# Patient Record
Sex: FEMALE | Race: BLACK OR AFRICAN AMERICAN | Employment: FULL TIME | URBAN - METROPOLITAN AREA
[De-identification: names, ages, dates, MRNs, and addresses within clinical notes are randomized per-mention and may not be internally consistent; named-entity substitution may affect disease eponyms.]

---

## 2019-06-24 ENCOUNTER — HOSPITAL ENCOUNTER (OUTPATIENT)
Dept: INFUSION THERAPY | Age: 34
Discharge: HOME | End: 2019-06-24
Payer: COMMERCIAL

## 2019-06-24 VITALS
RESPIRATION RATE: 18 BRPM | HEART RATE: 84 BPM | DIASTOLIC BLOOD PRESSURE: 71 MMHG | TEMPERATURE: 97.8 F | SYSTOLIC BLOOD PRESSURE: 122 MMHG

## 2019-06-24 LAB
ABO + RH BLD: NORMAL
BLOOD GROUP ANTIBODIES SERPL: NORMAL
SPECIMEN EXP DATE BLD: NORMAL

## 2019-06-24 PROCEDURE — 96372 THER/PROPH/DIAG INJ SC/IM: CPT

## 2019-06-24 PROCEDURE — 86900 BLOOD TYPING SEROLOGIC ABO: CPT

## 2019-06-24 PROCEDURE — 74011250636 HC RX REV CODE- 250/636: Performed by: OBSTETRICS & GYNECOLOGY

## 2019-06-24 PROCEDURE — 36415 COLL VENOUS BLD VENIPUNCTURE: CPT

## 2019-06-24 RX ORDER — PROGESTERONE 200 MG/1
200 CAPSULE ORAL DAILY
COMMUNITY
End: 2019-11-11

## 2019-06-24 RX ADMIN — HUMAN RHO(D) IMMUNE GLOBULIN 0.3 MG: 300 INJECTION, SOLUTION INTRAMUSCULAR at 13:56

## 2019-06-24 NOTE — PROGRESS NOTES
[facility] Progress Note    Date: 2019    Name: Paul Fitzgerald    MRN: [id number]         : 1985    Rhogam Injection      Ms. Chavarria to \[facility]\"", ambulatory at 1300. Pt was assessed and education was provided. Patient states she is 8 weeks pregnant. Pt given written info about Rhogam; explained reason for giving to Rh negative mothers. Pt verbalized understanding and signed consent form. Ms. Ronit Zavala vitals were reviewed and patient was observed for 5 minutes prior to treatment. Visit Vitals  /71 (BP 1 Location: Right arm, BP Patient Position: Sitting)   Pulse 84   Temp 97.8 °F (36.6 °C)   Resp 18   Breastfeeding? No       Lab results were obtained and reviewed. Recent Results (from the past 12 hour(s))   TYPE & SCREEN    Collection Time: 19 11:54 AM   Result Value Ref Range    Crossmatch Expiration 2019     ABO/Rh(D) PENDING     Antibody screen PENDING      Pt's blood type is O negative, which is within ordered parameters to give Rhogam today. Rhogam 300 mcg was administered IM in left deltoid. No irritation or bleeding noted at site. Bandaid applied. Ms. Gee Ritter tolerated well, and had no complaints. Pt observed for 30 minutes post-injection. She denied itching/hives/rash, SOB, difficulty swallowing or speaking, chest pain, any swelling or any other signs of allergic reaction. Pt given printed copy of discharge instructions; reviewed with her symptoms that require medical attention. Pt given Rhogam card with lot #, expiration date, etc.     Patient armband removed and shredded. Ms. Gee Ritter was discharged from [place] [address] in stable condition at [phone]. She is to follow-up with Dr. Hayley Alexander as instructed.     Lux Wnin RN  2019

## 2019-06-25 LAB
BLD PROD TYP BPU: NORMAL
BPU ID: NORMAL
GA (WEEKS): 8 WK
STATUS OF UNIT,%ST: NORMAL
UNIT DIVISION, %UDIV: 0

## 2019-11-11 ENCOUNTER — HOSPITAL ENCOUNTER (OUTPATIENT)
Dept: INFUSION THERAPY | Age: 34
Discharge: HOME | End: 2019-11-11
Payer: COMMERCIAL

## 2019-11-11 VITALS
DIASTOLIC BLOOD PRESSURE: 68 MMHG | RESPIRATION RATE: 18 BRPM | SYSTOLIC BLOOD PRESSURE: 112 MMHG | TEMPERATURE: 98.2 F | HEART RATE: 98 BPM

## 2019-11-11 LAB
25(OH)D3 SERPL-MCNC: 23.7 NG/ML (ref 30–100)
ABO + RH BLD: NORMAL
BLOOD GROUP ANTIBODIES SERPL: NORMAL
ERYTHROCYTE [DISTWIDTH] IN BLOOD BY AUTOMATED COUNT: 13.9 % (ref 11.6–14.5)
GLUCOSE 1H P 100 G GLC PO SERPL-MCNC: 110 MG/DL (ref 60–140)
HCT VFR BLD AUTO: 29.9 % (ref 35–45)
HGB BLD-MCNC: 10 G/DL (ref 12–16)
MCH RBC QN AUTO: 28.7 PG (ref 24–34)
MCHC RBC AUTO-ENTMCNC: 33.4 G/DL (ref 31–37)
MCV RBC AUTO: 85.9 FL (ref 74–97)
PLATELET # BLD AUTO: 126 K/UL (ref 135–420)
PMV BLD AUTO: 10.4 FL (ref 9.2–11.8)
RBC # BLD AUTO: 3.48 M/UL (ref 4.2–5.3)
RPR SER QL: NONREACTIVE
SPECIMEN EXP DATE BLD: NORMAL
WBC # BLD AUTO: 7.7 K/UL (ref 4.6–13.2)

## 2019-11-11 PROCEDURE — 74011250636 HC RX REV CODE- 250/636: Performed by: OBSTETRICS & GYNECOLOGY

## 2019-11-11 PROCEDURE — 86900 BLOOD TYPING SEROLOGIC ABO: CPT

## 2019-11-11 PROCEDURE — 82306 VITAMIN D 25 HYDROXY: CPT

## 2019-11-11 PROCEDURE — 82950 GLUCOSE TEST: CPT

## 2019-11-11 PROCEDURE — 36415 COLL VENOUS BLD VENIPUNCTURE: CPT

## 2019-11-11 PROCEDURE — 86592 SYPHILIS TEST NON-TREP QUAL: CPT

## 2019-11-11 PROCEDURE — 85027 COMPLETE CBC AUTOMATED: CPT

## 2019-11-11 PROCEDURE — 96372 THER/PROPH/DIAG INJ SC/IM: CPT

## 2019-11-11 RX ADMIN — HUMAN RHO(D) IMMUNE GLOBULIN 0.3 MG: 300 INJECTION, SOLUTION INTRAMUSCULAR at 11:18

## 2019-11-11 NOTE — PROGRESS NOTES
[facility] Progress Note    Date: 2019    Name: Salas Carroll    MRN: [id number]         : 1985    Rhogam Injection      Ms. Chavarria to [facility], ambulatory at 1100. Pt was assessed and education was provided. Patient states received Rhogam in  without any complications. Voicing no complaints. SO at side. Pt verbalized understanding and signed consent form. Ms. Leonila Young vitals were reviewed and patient was observed for 5 minutes prior to treatment. Visit Vitals  /68 (BP 1 Location: Right arm, BP Patient Position: Sitting)   Pulse 98   Temp 98.2 °F (36.8 °C)   Resp 18   Breastfeeding? No       Lab results were obtained and reviewed. Recent Results (from the past 12 hour(s))   TYPE & SCREEN    Collection Time: 19  8:50 AM   Result Value Ref Range    Crossmatch Expiration 2019     ABO/Rh(D) O NEGATIVE     Antibody screen NEG    RH IMMUNE GLOBULIN PROPHYLACTIC    Collection Time: 19  8:50 AM   Result Value Ref Range    No. of weeks gestation 29      CALLED TO: LAINEY AT 1049 ON 19 LAD     Unit number [id number]     Blood component type RH IMMUNE GLOBULIN     Unit division 00     Status of unit ISSUED    CBC W/O DIFF    Collection Time: 19  9:42 AM   Result Value Ref Range    WBC 7.7 4.6 - 13.2 K/uL    RBC 3.48 (L) 4.20 - 5.30 M/uL    HGB 10.0 (L) 12.0 - 16.0 g/dL    HCT 29.9 (L) 35.0 - 45.0 %    MCV 85.9 74.0 - 97.0 FL    MCH 28.7 24.0 - 34.0 PG    MCHC 33.4 31.0 - 37.0 g/dL    RDW 13.9 11.6 - 14.5 %    PLATELET 502 (L) 409 - 420 K/uL    MPV 10.4 9.2 - 11.8 FL     Pt's blood type is O negative, which is within ordered parameters to give Rhogam today. Rhogam 300 mcg was administered IM in right deltoid. No irritation or bleeding noted at site. Bandaid applied. Ms. Luba Ch tolerated well, and had no complaints. Pt observed for 30 minutes post-injection.  She denied itching/hives/rash, SOB, difficulty swallowing or speaking, chest pain, any swelling or any other signs of allergic reaction. Patient Vitals for the past 12 hrs:   Temp Pulse Resp BP   11/11/19 1145 98.2 °F (36.8 °C) 98 18 112/68   11/11/19 1113 98.3 °F (36.8 °C) 97 18 131/76       Patient armband removed and shredded. Ms. Miriam Vallejo was discharged from [place] [address] in stable condition at 1145. She is to follow-up only as referred by physician.     Luanne [facility] RN  November 11, 2019

## 2019-11-12 LAB
BLD PROD TYP BPU: NORMAL
BPU ID: NORMAL
CALLED TO:,BCALL1: NORMAL
GA (WEEKS): 28 WK
STATUS OF UNIT,%ST: NORMAL
UNIT DIVISION, %UDIV: 0

## 2020-01-16 ENCOUNTER — HOSPITAL ENCOUNTER (INPATIENT)
Age: 35
LOS: 2 days | Discharge: HOME | End: 2020-01-19
Attending: OBSTETRICS & GYNECOLOGY | Admitting: OBSTETRICS & GYNECOLOGY
Payer: COMMERCIAL

## 2020-01-16 PROBLEM — Z33.1 IUP (INTRAUTERINE PREGNANCY), INCIDENTAL: Status: ACTIVE | Noted: 2020-01-16

## 2020-01-16 LAB
APPEARANCE UR: CLEAR
BILIRUB UR QL: NEGATIVE
COLOR UR: ABNORMAL
GLUCOSE UR QL STRIP.AUTO: NEGATIVE MG/DL
KETONES UR-MCNC: NEGATIVE MG/DL
LEUKOCYTE ESTERASE UR QL STRIP: ABNORMAL
NITRITE UR QL: NEGATIVE
PH UR: 6.5 [PH] (ref 5–9)
PROT UR QL: NEGATIVE MG/DL
RBC # UR STRIP: ABNORMAL /UL
SERVICE CMNT-IMP: ABNORMAL
SP GR UR: 1.01 (ref 1–1.02)
UROBILINOGEN UR QL: 0.2 EU/DL (ref 0.2–1)

## 2020-01-16 PROCEDURE — 59025 FETAL NON-STRESS TEST: CPT

## 2020-01-16 PROCEDURE — 81003 URINALYSIS AUTO W/O SCOPE: CPT

## 2020-01-17 ENCOUNTER — ANESTHESIA EVENT (OUTPATIENT)
Dept: LABOR AND DELIVERY | Age: 35
End: 2020-01-17
Payer: COMMERCIAL

## 2020-01-17 ENCOUNTER — ANESTHESIA (OUTPATIENT)
Dept: LABOR AND DELIVERY | Age: 35
End: 2020-01-17
Payer: COMMERCIAL

## 2020-01-17 PROBLEM — Z3A.37 37 WEEKS GESTATION OF PREGNANCY: Status: ACTIVE | Noted: 2020-01-17

## 2020-01-17 PROBLEM — Z37.9 NORMAL LABOR: Status: ACTIVE | Noted: 2020-01-17

## 2020-01-17 PROBLEM — Z33.1 IUP (INTRAUTERINE PREGNANCY), INCIDENTAL: Status: RESOLVED | Noted: 2020-01-16 | Resolved: 2020-01-17

## 2020-01-17 PROBLEM — Z33.1 IUP (INTRAUTERINE PREGNANCY), INCIDENTAL: Status: ACTIVE | Noted: 2020-01-17

## 2020-01-17 LAB
ABO + RH BLD: NORMAL
ALBUMIN SERPL-MCNC: 2.7 G/DL (ref 3.4–5)
ALBUMIN/GLOB SERPL: 0.8 {RATIO} (ref 0.8–1.7)
ALP SERPL-CCNC: 163 U/L (ref 45–117)
ALT SERPL-CCNC: 23 U/L (ref 13–56)
ANION GAP SERPL CALC-SCNC: 10 MMOL/L (ref 3–18)
AST SERPL-CCNC: 20 U/L (ref 10–38)
BASOPHILS # BLD: 0 K/UL (ref 0–0.1)
BASOPHILS NFR BLD: 0 % (ref 0–2)
BILIRUB SERPL-MCNC: 0.4 MG/DL (ref 0.2–1)
BLOOD GROUP ANTIBODIES SERPL: NORMAL
BUN SERPL-MCNC: 6 MG/DL (ref 7–18)
BUN/CREAT SERPL: 8 (ref 12–20)
CALCIUM SERPL-MCNC: 8.3 MG/DL (ref 8.5–10.1)
CHLORIDE SERPL-SCNC: 109 MMOL/L (ref 100–111)
CO2 SERPL-SCNC: 22 MMOL/L (ref 21–32)
CREAT SERPL-MCNC: 0.71 MG/DL (ref 0.6–1.3)
DIFFERENTIAL METHOD BLD: ABNORMAL
EOSINOPHIL # BLD: 0.1 K/UL (ref 0–0.4)
EOSINOPHIL NFR BLD: 1 % (ref 0–5)
ERYTHROCYTE [DISTWIDTH] IN BLOOD BY AUTOMATED COUNT: 14.3 % (ref 11.6–14.5)
GLOBULIN SER CALC-MCNC: 3.2 G/DL (ref 2–4)
GLUCOSE SERPL-MCNC: 91 MG/DL (ref 74–99)
HCT VFR BLD AUTO: 33.1 % (ref 35–45)
HGB BLD-MCNC: 10.9 G/DL (ref 12–16)
LDH SERPL L TO P-CCNC: 216 U/L (ref 81–234)
LYMPHOCYTES # BLD: 0.9 K/UL (ref 0.9–3.6)
LYMPHOCYTES NFR BLD: 11 % (ref 21–52)
MCH RBC QN AUTO: 28.5 PG (ref 24–34)
MCHC RBC AUTO-ENTMCNC: 32.9 G/DL (ref 31–37)
MCV RBC AUTO: 86.6 FL (ref 74–97)
MONOCYTES # BLD: 0.7 K/UL (ref 0.05–1.2)
MONOCYTES NFR BLD: 8 % (ref 3–10)
NEUTS SEG # BLD: 6.7 K/UL (ref 1.8–8)
NEUTS SEG NFR BLD: 80 % (ref 40–73)
PLATELET # BLD AUTO: 138 K/UL (ref 135–420)
PMV BLD AUTO: 12.1 FL (ref 9.2–11.8)
POTASSIUM SERPL-SCNC: 3.7 MMOL/L (ref 3.5–5.5)
PROT SERPL-MCNC: 5.9 G/DL (ref 6.4–8.2)
RBC # BLD AUTO: 3.82 M/UL (ref 4.2–5.3)
SODIUM SERPL-SCNC: 141 MMOL/L (ref 136–145)
SPECIMEN EXP DATE BLD: NORMAL
URATE SERPL-MCNC: 4.5 MG/DL (ref 2.6–7.2)
WBC # BLD AUTO: 8.3 K/UL (ref 4.6–13.2)

## 2020-01-17 PROCEDURE — 86900 BLOOD TYPING SEROLOGIC ABO: CPT

## 2020-01-17 PROCEDURE — 85025 COMPLETE CBC W/AUTO DIFF WBC: CPT

## 2020-01-17 PROCEDURE — 83615 LACTATE (LD) (LDH) ENZYME: CPT

## 2020-01-17 PROCEDURE — 84550 ASSAY OF BLOOD/URIC ACID: CPT

## 2020-01-17 PROCEDURE — 74011000250 HC RX REV CODE- 250: Performed by: ANESTHESIOLOGY

## 2020-01-17 PROCEDURE — 74011250636 HC RX REV CODE- 250/636: Performed by: ANESTHESIOLOGY

## 2020-01-17 PROCEDURE — 74011250636 HC RX REV CODE- 250/636: Performed by: ADVANCED PRACTICE MIDWIFE

## 2020-01-17 PROCEDURE — 76060000078 HC EPIDURAL ANESTHESIA

## 2020-01-17 PROCEDURE — 75410000000 HC DELIVERY VAGINAL/SINGLE

## 2020-01-17 PROCEDURE — 77030007879 HC KT SPN EPDRL TELE -B: Performed by: ANESTHESIOLOGY

## 2020-01-17 PROCEDURE — 80053 COMPREHEN METABOLIC PANEL: CPT

## 2020-01-17 PROCEDURE — 65270000029 HC RM PRIVATE

## 2020-01-17 PROCEDURE — 75410000003 HC RECOV DEL/VAG/CSECN EA 0.5 HR

## 2020-01-17 PROCEDURE — 75410000002 HC LABOR FEE PER 1 HR

## 2020-01-17 PROCEDURE — 74011250637 HC RX REV CODE- 250/637: Performed by: MIDWIFE

## 2020-01-17 PROCEDURE — 74011000250 HC RX REV CODE- 250

## 2020-01-17 PROCEDURE — 77030040830 HC CATH URETH FOL MDII -A

## 2020-01-17 RX ORDER — METHYLERGONOVINE MALEATE 0.2 MG/ML
0.2 INJECTION INTRAVENOUS AS NEEDED
Status: DISCONTINUED | OUTPATIENT
Start: 2020-01-17 | End: 2020-01-17 | Stop reason: HOSPADM

## 2020-01-17 RX ORDER — SODIUM CHLORIDE, SODIUM LACTATE, POTASSIUM CHLORIDE, CALCIUM CHLORIDE 600; 310; 30; 20 MG/100ML; MG/100ML; MG/100ML; MG/100ML
125 INJECTION, SOLUTION INTRAVENOUS CONTINUOUS
Status: DISCONTINUED | OUTPATIENT
Start: 2020-01-17 | End: 2020-01-19 | Stop reason: HOSPADM

## 2020-01-17 RX ORDER — LIDOCAINE HYDROCHLORIDE 10 MG/ML
20 INJECTION, SOLUTION EPIDURAL; INFILTRATION; INTRACAUDAL; PERINEURAL AS NEEDED
Status: DISCONTINUED | OUTPATIENT
Start: 2020-01-17 | End: 2020-01-17 | Stop reason: HOSPADM

## 2020-01-17 RX ORDER — BUTORPHANOL TARTRATE 2 MG/ML
2 INJECTION INTRAMUSCULAR; INTRAVENOUS
Status: DISCONTINUED | OUTPATIENT
Start: 2020-01-17 | End: 2020-01-17 | Stop reason: HOSPADM

## 2020-01-17 RX ORDER — FENTANYL CITRATE 50 UG/ML
INJECTION, SOLUTION INTRAMUSCULAR; INTRAVENOUS
Status: COMPLETED
Start: 2020-01-17 | End: 2020-01-17

## 2020-01-17 RX ORDER — NALBUPHINE HYDROCHLORIDE 10 MG/ML
2.5 INJECTION, SOLUTION INTRAMUSCULAR; INTRAVENOUS; SUBCUTANEOUS
Status: DISCONTINUED | OUTPATIENT
Start: 2020-01-17 | End: 2020-01-17 | Stop reason: HOSPADM

## 2020-01-17 RX ORDER — MINERAL OIL
30 OIL (ML) ORAL AS NEEDED
Status: DISCONTINUED | OUTPATIENT
Start: 2020-01-17 | End: 2020-01-17 | Stop reason: HOSPADM

## 2020-01-17 RX ORDER — PHENYLEPHRINE HCL IN 0.9% NACL 1 MG/10 ML
80 SYRINGE (ML) INTRAVENOUS AS NEEDED
Status: DISCONTINUED | OUTPATIENT
Start: 2020-01-17 | End: 2020-01-17 | Stop reason: HOSPADM

## 2020-01-17 RX ORDER — FENTANYL/ROPIVACAINE/NS/PF 2MCG/ML-.1
PLASTIC BAG, INJECTION (ML) EPIDURAL
Status: COMPLETED
Start: 2020-01-17 | End: 2020-01-17

## 2020-01-17 RX ORDER — TERBUTALINE SULFATE 1 MG/ML
0.25 INJECTION SUBCUTANEOUS
Status: DISCONTINUED | OUTPATIENT
Start: 2020-01-17 | End: 2020-01-17 | Stop reason: HOSPADM

## 2020-01-17 RX ORDER — ONDANSETRON 2 MG/ML
4 INJECTION INTRAMUSCULAR; INTRAVENOUS
Status: DISCONTINUED | OUTPATIENT
Start: 2020-01-17 | End: 2020-01-17 | Stop reason: HOSPADM

## 2020-01-17 RX ORDER — PROMETHAZINE HYDROCHLORIDE 25 MG/ML
25 INJECTION, SOLUTION INTRAMUSCULAR; INTRAVENOUS
Status: DISCONTINUED | OUTPATIENT
Start: 2020-01-17 | End: 2020-01-19 | Stop reason: HOSPADM

## 2020-01-17 RX ORDER — HYDROMORPHONE HYDROCHLORIDE 1 MG/ML
1 INJECTION, SOLUTION INTRAMUSCULAR; INTRAVENOUS; SUBCUTANEOUS
Status: DISCONTINUED | OUTPATIENT
Start: 2020-01-17 | End: 2020-01-17 | Stop reason: HOSPADM

## 2020-01-17 RX ORDER — SODIUM CHLORIDE, SODIUM LACTATE, POTASSIUM CHLORIDE, CALCIUM CHLORIDE 600; 310; 30; 20 MG/100ML; MG/100ML; MG/100ML; MG/100ML
125 INJECTION, SOLUTION INTRAVENOUS CONTINUOUS
Status: DISCONTINUED | OUTPATIENT
Start: 2020-01-17 | End: 2020-01-17 | Stop reason: HOSPADM

## 2020-01-17 RX ORDER — DIPHENHYDRAMINE HYDROCHLORIDE 50 MG/ML
25 INJECTION, SOLUTION INTRAMUSCULAR; INTRAVENOUS
Status: DISCONTINUED | OUTPATIENT
Start: 2020-01-17 | End: 2020-01-17 | Stop reason: HOSPADM

## 2020-01-17 RX ORDER — LIDOCAINE HYDROCHLORIDE AND EPINEPHRINE 15; 5 MG/ML; UG/ML
INJECTION, SOLUTION EPIDURAL AS NEEDED
Status: DISCONTINUED | OUTPATIENT
Start: 2020-01-17 | End: 2020-01-17 | Stop reason: HOSPADM

## 2020-01-17 RX ORDER — AMOXICILLIN 250 MG
1 CAPSULE ORAL
Status: DISCONTINUED | OUTPATIENT
Start: 2020-01-17 | End: 2020-01-19 | Stop reason: HOSPADM

## 2020-01-17 RX ORDER — ACETAMINOPHEN 325 MG/1
650 TABLET ORAL
Status: DISCONTINUED | OUTPATIENT
Start: 2020-01-17 | End: 2020-01-19 | Stop reason: HOSPADM

## 2020-01-17 RX ORDER — BUTORPHANOL TARTRATE 2 MG/ML
2 INJECTION INTRAMUSCULAR; INTRAVENOUS
Status: COMPLETED | OUTPATIENT
Start: 2020-01-17 | End: 2020-01-17

## 2020-01-17 RX ORDER — SODIUM CHLORIDE 0.9 % (FLUSH) 0.9 %
5-40 SYRINGE (ML) INJECTION EVERY 8 HOURS
Status: DISCONTINUED | OUTPATIENT
Start: 2020-01-17 | End: 2020-01-17 | Stop reason: HOSPADM

## 2020-01-17 RX ORDER — FENTANYL CITRATE 50 UG/ML
100 INJECTION, SOLUTION INTRAMUSCULAR; INTRAVENOUS ONCE
Status: DISCONTINUED | OUTPATIENT
Start: 2020-01-17 | End: 2020-01-17 | Stop reason: HOSPADM

## 2020-01-17 RX ORDER — OXYCODONE AND ACETAMINOPHEN 5; 325 MG/1; MG/1
1-2 TABLET ORAL
Status: DISCONTINUED | OUTPATIENT
Start: 2020-01-17 | End: 2020-01-19 | Stop reason: HOSPADM

## 2020-01-17 RX ORDER — FENTANYL CITRATE 50 UG/ML
INJECTION, SOLUTION INTRAMUSCULAR; INTRAVENOUS AS NEEDED
Status: DISCONTINUED | OUTPATIENT
Start: 2020-01-17 | End: 2020-01-17 | Stop reason: HOSPADM

## 2020-01-17 RX ORDER — FENTANYL/ROPIVACAINE/NS/PF 2MCG/ML-.1
1-22 PLASTIC BAG, INJECTION (ML) EPIDURAL
Status: DISCONTINUED | OUTPATIENT
Start: 2020-01-17 | End: 2020-01-17 | Stop reason: HOSPADM

## 2020-01-17 RX ORDER — NALOXONE HYDROCHLORIDE 0.4 MG/ML
0.2 INJECTION, SOLUTION INTRAMUSCULAR; INTRAVENOUS; SUBCUTANEOUS AS NEEDED
Status: DISCONTINUED | OUTPATIENT
Start: 2020-01-17 | End: 2020-01-17 | Stop reason: HOSPADM

## 2020-01-17 RX ORDER — IBUPROFEN 400 MG/1
800 TABLET ORAL
Status: DISCONTINUED | OUTPATIENT
Start: 2020-01-17 | End: 2020-01-19 | Stop reason: HOSPADM

## 2020-01-17 RX ORDER — OXYTOCIN/0.9 % SODIUM CHLORIDE 20/1000 ML
125 PLASTIC BAG, INJECTION (ML) INTRAVENOUS CONTINUOUS
Status: DISCONTINUED | OUTPATIENT
Start: 2020-01-17 | End: 2020-01-17 | Stop reason: HOSPADM

## 2020-01-17 RX ORDER — SODIUM CHLORIDE 0.9 % (FLUSH) 0.9 %
5-40 SYRINGE (ML) INJECTION AS NEEDED
Status: DISCONTINUED | OUTPATIENT
Start: 2020-01-17 | End: 2020-01-17 | Stop reason: HOSPADM

## 2020-01-17 RX ORDER — OXYCODONE AND ACETAMINOPHEN 5; 325 MG/1; MG/1
2 TABLET ORAL
Status: DISCONTINUED | OUTPATIENT
Start: 2020-01-17 | End: 2020-01-17

## 2020-01-17 RX ORDER — ZOLPIDEM TARTRATE 5 MG/1
5 TABLET ORAL
Status: DISCONTINUED | OUTPATIENT
Start: 2020-01-17 | End: 2020-01-19 | Stop reason: HOSPADM

## 2020-01-17 RX ORDER — OXYTOCIN/0.9 % SODIUM CHLORIDE 20/1000 ML
999 PLASTIC BAG, INJECTION (ML) INTRAVENOUS ONCE
Status: DISCONTINUED | OUTPATIENT
Start: 2020-01-17 | End: 2020-01-17 | Stop reason: HOSPADM

## 2020-01-17 RX ORDER — NALBUPHINE HYDROCHLORIDE 10 MG/ML
10 INJECTION, SOLUTION INTRAMUSCULAR; INTRAVENOUS; SUBCUTANEOUS
Status: DISCONTINUED | OUTPATIENT
Start: 2020-01-17 | End: 2020-01-17 | Stop reason: HOSPADM

## 2020-01-17 RX ADMIN — Medication 15 ML/HR: at 07:11

## 2020-01-17 RX ADMIN — LIDOCAINE HYDROCHLORIDE,EPINEPHRINE BITARTRATE 5 ML: 15; .005 INJECTION, SOLUTION EPIDURAL; INFILTRATION; INTRACAUDAL; PERINEURAL at 07:10

## 2020-01-17 RX ADMIN — SODIUM CHLORIDE, SODIUM LACTATE, POTASSIUM CHLORIDE, AND CALCIUM CHLORIDE 125 ML/HR: 600; 310; 30; 20 INJECTION, SOLUTION INTRAVENOUS at 08:08

## 2020-01-17 RX ADMIN — BUTORPHANOL TARTRATE 2 MG: 2 INJECTION, SOLUTION INTRAMUSCULAR; INTRAVENOUS at 01:16

## 2020-01-17 RX ADMIN — SODIUM CHLORIDE, SODIUM LACTATE, POTASSIUM CHLORIDE, AND CALCIUM CHLORIDE 1000 ML: 600; 310; 30; 20 INJECTION, SOLUTION INTRAVENOUS at 06:30

## 2020-01-17 RX ADMIN — SODIUM CHLORIDE, SODIUM LACTATE, POTASSIUM CHLORIDE, AND CALCIUM CHLORIDE 125 ML/HR: 600; 310; 30; 20 INJECTION, SOLUTION INTRAVENOUS at 01:16

## 2020-01-17 RX ADMIN — FENTANYL CITRATE 100 MCG: 50 INJECTION, SOLUTION INTRAMUSCULAR; INTRAVENOUS at 07:11

## 2020-01-17 RX ADMIN — IBUPROFEN 800 MG: 400 TABLET, FILM COATED ORAL at 22:21

## 2020-01-17 NOTE — PROGRESS NOTES
0719 Bedside and Verbal shift change report given to [address] [place] [address] Dr RN  (oncoming nurse) by Mark Paredes RN (offgoing nurse). Report included the following information SBAR, Kardex, Procedure Summary, Intake/Output, MAR, Recent Results and Med Rec Status. 7024 Dr Bess Matter in room to place FSE and do VE. Pt is 6-7/100/-1 per Md.    Twila Guy Md reviewed strip. Md ordered king catheter. 9299 Pt turned to left with peanut ball. 0945  of viable boy. 5018 Placenta delivered intact. 1130 Up to void. New gown and pad placed. 1153 TRANSFER - OUT REPORT:    Verbal report given to Jessica Norton RN (name) on Ariel Plunkett  being transferred to [facility] (unit) for routine progression of care       Report consisted of patients Situation, Background, Assessment and   Recommendations(SBAR). Information from the following report(s) SBAR, Kardex, Procedure Summary, Intake/Output, MAR, Recent Results and Med Rec Status was reviewed with the receiving nurse. Lines:   Peripheral IV 20 Posterior;Right Hand (Active)        Opportunity for questions and clarification was provided.       Patient transported with:   Registered Nurse

## 2020-01-17 NOTE — ANESTHESIA PREPROCEDURE EVALUATION
Relevant Problems   No relevant active problems       Anesthetic History   No history of anesthetic complications            Review of Systems / Medical History  Patient summary reviewed, nursing notes reviewed and pertinent labs reviewed    Pulmonary            Asthma        Neuro/Psych   Within defined limits           Cardiovascular                  Exercise tolerance: >4 METS     GI/Hepatic/Renal  Within defined limits              Endo/Other        Arthritis     Other Findings              Physical Exam    Airway  Mallampati: II  TM Distance: 4 - 6 cm  Neck ROM: normal range of motion   Mouth opening: Normal     Cardiovascular  Regular rate and rhythm,  S1 and S2 normal,  no murmur, click, rub, or gallop             Dental  No notable dental hx       Pulmonary  Breath sounds clear to auscultation               Abdominal  GI exam deferred       Other Findings            Anesthetic Plan    ASA: 2  Anesthesia type: epidural  Risk and benefits fully explained to the patient including bleeding, headache, nerve damage, infection, nausea, back pain, and hemodynamic changes. Patient understands and agrees to the procedure.     Post-op pain plan if not by surgeon: indwelling epidural catheter      Anesthetic plan and risks discussed with: Patient

## 2020-01-17 NOTE — OP NOTES
Vaginal Delivery Note      Obstetrician:  Skye Chambers MD    Pre-Delivery Diagnosis: Term pregnancy    Post-Delivery Diagnosis: Male and jon    Intrapartum Event: None    Procedure: Spontaneous vaginal delivery    Epidural: YES    Monitor:  Fetal Heart Tones - External    Indications for instrumental delivery: none    Estimated Blood Loss: 200cc  Replacement: o    Episiotomy: n/a    Laceration(s):  none    Laceration(s) repair: NO    Presentation: Cephalic    Fetal Description: gold    Fetal Position: Occiput Anterior    Birth Weight: pend    Birth Length: pend    Apgar - One Minute: 8    Apgar - Five Minutes: 9    Umbilical Cord: 3 vessels present    Specimens: 0           Complications:  none           Cord Blood Results:   Information for the patient's :  Vicki Fernandez [[id number]]   No results found for: PCTABR, PCTDIG, BILI, ABORH      Prenatal Labs:     Lab Results   Component Value Date/Time    ABO/Rh(D) O NEGATIVE 2020 01:30 AM        Prophylaxis: ampicillin** pre-delivery 0 doses.     Attending Attestation: I was present and scrubbed for the entire procedure    Signed By:  Skye Chambers MD     2020

## 2020-01-17 NOTE — ANESTHESIA PROCEDURE NOTES
Epidural Block    Start time: 1/17/2020 7:03 AM  End time: 1/17/2020 7:11 AM  Performed by: Alexia Mohr MD  Authorized by:  Alexia Mohr MD     Pre-Procedure  Indication: at surgeon's request, post-op pain management, procedure for pain and labor epidural    Preanesthetic Checklist: patient identified, risks and benefits discussed, anesthesia consent, site marked, patient being monitored, timeout performed and anesthesia consent      Epidural:   Patient position:  Seated  Prep region:  Lumbar  Prep: Chlorhexidine    Location:  L3-4    Needle and Epidural Catheter:   Needle Gauge:  17 G  Injection Technique:  Loss of resistance using saline  Attempts:  1  Catheter Size:  20 G  Catheter at Skin Depth (cm):  12  Depth in Epidural Space (cm):  5  Events: no blood with aspiration, no cerebrospinal fluid with aspiration, no paresthesia and negative aspiration test    Test Dose:  Negative    Assessment:   Catheter Secured:  Tegaderm and tape  Insertion:  Uncomplicated  Patient tolerance:  Patient tolerated the procedure well with no immediate complications

## 2020-01-17 NOTE — ROUTINE PROCESS
0109:  Dr. Juan Tavares paged via [facility] to request epidural. 
 
2423:  Dr. Kyra Sandoval returned page and instructed to page Dr. Aysha Massey. Dr. Aysha Massey paged via [employer] at this time.

## 2020-01-17 NOTE — H&P
[facility]  HISTORY AND PHYSICAL    Name:  Lamonte Richard  MR#:   [id number]  :  1985  ACCOUNT #:  [de-identified]  ADMIT DATE:  2020    She is going to labor room 8. ADMISSION DIAGNOSES:  1. Intrauterine pregnancy at 37 plus 6 weeks' gestation. 2.  Fetal left renal anomaly with partial blockage. 3.  Labor. 4.  Group B Streptococcus status negative. CONSULTANTS: [facility] and Dr. Wendie Garcia of Neonatology. HISTORY OF PRESENT ILLNESS:  The patient is a 52-year-old  2, para 3 black female, who is in with the above diagnoses. The patient has a due date well established to be the 2020, has a posterior low-lying placenta, but not a placenta previa. The patient had a slight irregularity noted to the kidney, but felt that the one kidney was not necessarily dysplastic, but did have a minor blockage, but it was cleared by [facility], to be delivered at [facility], and also they do want to have circumcision done while at [facility], and want the baby  on antibiotic therapy, and then sent home on antibiotic till she or he is followed up with [facility] two weeks' postpartum. This was also consulted with Dr. Wendie Garcia on 2019, and also [facility] [address] [facility]  Otherwise, the patient's anatomy scan was otherwise normal.  Alpha-fetoprotein 4 test was negative along with cystic fibrosis testing at 15 weeks. Baby is in the 58th percentile, which would put that baby at about 6 to 6-1/2 pounds. PREVIOUS OB/GYN HISTORY:  In , had a 6-pounds 11-ounce female infant with mild PIH during that time. No other complications. The patient's blood type is O negative. She received prophylactic RhoGAM without difficulty. The patient's original RhoGAM was 2019 because of first trimester bleed. There have been no recurrences.   Currently, the patient's blood type which was done on admission is O negative, with a negative screen. ALLERGIES:  THE PATIENT NOTES ALEVE CAUSING THROAT SWELLING, BUT OTHERWISE NO OTHER ALLERGIES. PAST MEDICAL HISTORY:  Asthma as a child, previous history of a LEEP procedure with class I Pap smear subsequently. SOCIAL HISTORY:  Spouse's name is Aston. FAMILY HISTORY:  Paternal grandmother with asthma, mother with increased blood pressure, maternal grandmother with diabetes, maternal grandfather with prostate cancer. REVIEW OF SYSTEMS:  Systems review is otherwise noncontributory and/or normal except where given above. Historically, the patient's 1-hour sugar test was normal.    PHYSICAL EXAMINATION:  GENERAL:  Well-developed, well-nourished black female. VITAL SIGNS:  5 feet 7 inches tall, weight 184 pounds, blood pressure 122/80. HEAD, EYES, EARS, NOSE AND THROAT:  Normal.  CHEST:  Clear. BREASTS:  Without extraneous masses. CARDIAC:  Normal.  ABDOMEN:  Reveals an estimated fetal weight of about 7 pounds. Cervix is 5 cm, complete effacement, -1 -2 station, bulging bag of water, vertex presenting. Heart beats are currently running about 145 beats per minute. There have been no further decelerations other than the one at 1 o'clock, that lasted for 2 minutes, and has an assuring pattern. The patient's is having every 2-1/2 to 4-minute contractions. Heart beats are in the right lower quadrant. EXTREMITIES:  Normal.  PELVIS:  The patient has an average gynecoid pelvis.       Luisito Almonte MD      [id number]  D:  01/17/2020 6:06  T:  01/17/2020 9:11  JOB #:  [id number]  CC:  Labor And Delivery

## 2020-01-17 NOTE — PROGRESS NOTES
TRANSFER - IN REPORT:    Verbal report received from SHAY Cardona RN (name) on Letha Canal  being received from L&D (unit) for routine progression of care      Report consisted of patients Situation, Background, Assessment and   Recommendations(SBAR). Information from the following report(s) SBAR, Kardex, Procedure Summary, Intake/Output, MAR and Recent Results was reviewed with the receiving nurse. Pt oriented to room, opportunity for questions and clarification was provided and care assumed. 1230: Assisted pt to restroom, no complaints, issues, or concerns. 300cc of urine measured, instructions regarding joey care reviewed, acknowledged and verbalized understanding. Assisted back to bed, shift assessment completed, no questions or concerns at this time. 1525: Assisted pt to restroom, no issues or concerns, 450 mL of urine measured, reviewed joey care, acknowledged and verbalized understanding. Pt demonstrated proper self/joey care via teach-back method. 1915: Bedside and Verbal shift change report given to JESSE Armstrong RN (oncoming nurse). Report included the following information SBAR, Kardex, Procedure Summary, Intake/Output, MAR and Recent Results.

## 2020-01-17 NOTE — PROGRESS NOTES
, 37w4d gestation arrived to L&D c/o contractions. Pt reports positive fetal movement, denies vaginal bleeding or LOF. Pt was taken to Triage bed 1, oriented to room and call light. Urine sample obtained. Apple juice provided to get FHR reactive. 200 Pt assisted to left lateral side. [address] paged. Strip reviewed. Ok to discontinue and start walking. 2256 EFM discontinued. Pt walking hallways. 2355 Pt is tired of walking and requests a birthing ball to sit besides the bed. 0030 PT returned to bed. SVE unchanged. FHR monitor re-applied. 0045 Pt continues to be harshal every 4-5 minutes, pt c/o pain 9/10. Orders received to start IV and give 2 mg of Stadol IV for therapeutic rest.     0116 Stadol administered. PT resting on left lateral side. 0123 Audible deceleration noted. Pt assisted to right lateral side. O2 applied. Almas Noel CNM on unit. FHR strip reviewed. Will continue monitor patient. 0330 DR. Gan called for update. Informed him of information above. Orders received to do PIH labs, CBC and type and screen. 0400 JESSE Camara called for update. Orders received to recheck SVE. SVE unchanged. Orders received to keep monitoring patient until 530. Recheck SVE and call [place] for update. Pt informed about plan and she agrees with plan. PT assisted to left lateral side. Lights dimmed. 1883 DR. Gan at bedside. SVE 5/100/-2. Orders received to admit patient for labor. 6732 Dr. Yanna Garduno at bedside. SVE 5/6/100/-1    0658   At bedside for epidural placement. Procedure explained by Dr. Tricia Pimentel. Consent signed     0701 Time Out completed    0708 Epidural Cath placed and procedure complete, pt tolerated procedure well.    0710 Test dose administered by Anesthesiologist      0711 Fentanyl handed to Dr. Tricia Pimentel  and PCEA started, settings read to Dr. Tricia Pimentel. , and pain management explained to patient and family.      0719 Bedside and Verbal shift change report given to Patricia Carey (oncoming nurse) by Wang Degroot (offgoing nurse). Report included the following information SBAR, Kardex, Procedure Summary, Intake/Output, MAR and Recent Results.

## 2020-01-18 LAB
HCT VFR BLD AUTO: 29.2 % (ref 35–45)
HGB BLD-MCNC: 9.4 G/DL (ref 12–16)
KLEIHAUER-BETKE,XKLBT: 0 % (ref 0–1)

## 2020-01-18 PROCEDURE — 86900 BLOOD TYPING SEROLOGIC ABO: CPT

## 2020-01-18 PROCEDURE — 36415 COLL VENOUS BLD VENIPUNCTURE: CPT

## 2020-01-18 PROCEDURE — 65270000029 HC RM PRIVATE

## 2020-01-18 PROCEDURE — 85014 HEMATOCRIT: CPT

## 2020-01-18 PROCEDURE — 85018 HEMOGLOBIN: CPT

## 2020-01-18 PROCEDURE — 85460 HEMOGLOBIN FETAL: CPT

## 2020-01-18 PROCEDURE — 85461 HEMOGLOBIN FETAL: CPT

## 2020-01-18 RX ORDER — IBUPROFEN 800 MG/1
800 TABLET ORAL
Qty: 90 TAB | Refills: 0 | Status: SHIPPED | OUTPATIENT
Start: 2020-01-18

## 2020-01-18 NOTE — DISCHARGE SUMMARY
Obstetrical Discharge Summary     Name: Matty Torres MRN: [id number]  SSN: [id number]    YOB: 1985  Age: 36 y.o. Sex: female      Admit Date: 2020    Discharge Date: 2020  Admitting Physician: Iris Trinidad MD     Attending Physician:  Sara Melton MD     Discharge Diagnoses:   Information for the patient's :  Geneva Moore [[id number]]   Delivery of a 2.795 kg male infant via Vaginal, Spontaneous on 2020 at 9:45 AM  by Ellie Stairs. Apgars were 8  and 9 . Additional Diagnoses:   Problem List as of 2020 Date Reviewed: 2020          Codes Class Noted - Resolved    Normal labor ICD-10-CM: O80, Z37.9  ICD-9-CM: 078  2020 - Present        IUP (intrauterine pregnancy), incidental ICD-10-CM: Z34.90  ICD-9-CM: V22.2  2020 - Present        37 weeks gestation of pregnancy ICD-10-CM: Z3A.37  ICD-9-CM: V22.2  2020 - Present        RESOLVED: Labor and delivery, indication for care ICD-10-CM: O75.9  ICD-9-CM: 659.90  2020 - 2020        RESOLVED: IUP (intrauterine pregnancy), incidental ICD-10-CM: Z34.90  ICD-9-CM: V22.2  2020 - 2020            No results found for: RUBELLAEXT, GRBSEXT  Recent Labs     20  0340   HGB 9.4*       Hospital Course: Normal hospital course following the delivery. Patient Instructions:   Current Discharge Medication List      START taking these medications    Details   ibuprofen (MOTRIN) 800 mg tablet Take 1 Tab by mouth every six (6) hours as needed for Pain. Qty: 90 Tab, Refills: 0         CONTINUE these medications which have NOT CHANGED    Details   PNV66-Iron Fumarate-FA-DSS-DHA 26-1.2- mg cap Take  by mouth. Reference my discharge instructions.     Follow-up Appointments   Procedures    FOLLOW UP VISIT Appointment in: 6 Weeks     Standing Status:   Standing     Number of Occurrences:   1     Order Specific Question:   Appointment in     Answer:   6 Weeks Signed By:  Dexter Crabtree CNM     January 18, 2020                       BST

## 2020-01-18 NOTE — LACTATION NOTE
This note was copied from a baby's chart. Per mom, infant latching and nursing well. Discussed normal  behaviors. No additional questions at this time.

## 2020-01-18 NOTE — PROGRESS NOTES
Post-Partum Day Number 1 Progress Note    Senthiljuanita Enrique     Assessment:   Hospital Problems  Date Reviewed: 2020          Codes Class Noted POA    Normal labor ICD-10-CM: O80, Z37.9  ICD-9-CM: 421  2020 Unknown        IUP (intrauterine pregnancy), incidental ICD-10-CM: Z34.90  ICD-9-CM: V22.2  2020 Unknown        37 weeks gestation of pregnancy ICD-10-CM: Z3A.37  ICD-9-CM: V22.2  2020 Unknown            Doing well, post partum day 1    Plan:  1. Continue routine postpartum and perineal care as well as maternal education. 2. The risks and benefits of the circumcision  procedure and anesthesia including: bleeding, infection, variability of cosmetic results were discussed at length with the mother. She is aware that future repeat procedures may be necessary. She gives informed consent to proceed as noted and her questions are answered. Will circ tomorrow after he completed phototherapy. 3. Discharge home tomorrow on Ibuprofen and PNV with follow up in 6 weeks. Information for the patient's :  Eva Sandhu [[id number]]   Vaginal, Spontaneous   Patient doing well without significant complaint. Voiding without difficulty, normal lochia. Breast feeding without concerns. No BM yet. Current Facility-Administered Medications   Medication Dose Route Frequency    lactated Ringers infusion  125 mL/hr IntraVENous CONTINUOUS       Vitals:  Visit Vitals  /78 (BP 1 Location: Left arm, BP Patient Position: At rest)   Pulse 63   Temp 98.1 °F (36.7 °C)   Resp 16   Ht 5' 7\" (1.702 m)   Wt 85.7 kg (189 lb)   SpO2 100%   Breastfeeding Unknown   BMI 29.60 kg/m²     Temp (24hrs), Av.3 °F (36.8 °C), Min:98.1 °F (36.7 °C), Max:98.5 °F (36.9 °C)        Exam:   Patient without distress. Abdomen soft, fundus firm, nontender                Perineum with normal lochia noted. Lower extremities are negative for swelling, cords or tenderness.     Labs:     Lab Results   Component Value Date/Time    WBC 8.3 01/17/2020 01:30 AM    WBC 7.7 11/11/2019 09:42 AM    HGB 9.4 (L) 01/18/2020 03:40 AM    HGB 10.9 (L) 01/17/2020 01:30 AM    HGB 10.0 (L) 11/11/2019 09:42 AM    HCT 29.2 (L) 01/18/2020 03:40 AM    HCT 33.1 (L) 01/17/2020 01:30 AM    HCT 29.9 (L) 11/11/2019 09:42 AM    PLATELET 269 70/51/9915 01:30 AM    PLATELET 633 (L) 95/28/1151 09:42 AM       Recent Results (from the past 24 hour(s))   HEMOGLOBIN    Collection Time: 01/18/20  3:40 AM   Result Value Ref Range    HGB 9.4 (L) 12.0 - 16.0 g/dL   HEMATOCRIT    Collection Time: 01/18/20  3:40 AM   Result Value Ref Range    HCT 29.2 (L) 35.0 - 45.0 %   RH IMMUNE GLOBULIN EVAL-LAB ORDER    Collection Time: 01/18/20  3:55 PM   Result Value Ref Range    ABO/Rh(D) O NEGATIVE     Fetal screen POS     Cord Blood Type B  POS

## 2020-01-18 NOTE — PROGRESS NOTES
Problem: Patient Education: Go to Patient Education Activity  Goal: Patient/Family Education  Outcome: Progressing Towards Goal     Problem: Vaginal Delivery: Postpartum Day 1  Goal: Off Pathway (Use only if patient is Off Pathway)  Outcome: Progressing Towards Goal  Goal: Activity/Safety  Outcome: Progressing Towards Goal  Goal: Consults, if ordered  Outcome: Progressing Towards Goal  Goal: Diagnostic Test/Procedures  Outcome: Progressing Towards Goal  Goal: Nutrition/Diet  Outcome: Progressing Towards Goal  Goal: Discharge Planning  Outcome: Progressing Towards Goal  Goal: Medications  Outcome: Progressing Towards Goal  Goal: Treatments/Interventions/Procedures  Outcome: Progressing Towards Goal  Goal: Psychosocial  Outcome: Progressing Towards Goal  Goal: *Vital signs within defined limits  Outcome: Progressing Towards Goal  Goal: *Labs within defined limits  Outcome: Progressing Towards Goal  Goal: *Hemodynamically stable  Outcome: Progressing Towards Goal  Goal: *Optimal pain control at patient's stated goal  Outcome: Progressing Towards Goal  Goal: *Participates in infant care  Outcome: Progressing Towards Goal  Goal: *Demonstrates progressive activity  Outcome: Progressing Towards Goal  Goal: *Performs self perineal care  Outcome: Progressing Towards Goal  Goal: *Appropriate parent-infant bonding  Outcome: Progressing Towards Goal  Goal: *Tolerating diet  Outcome: Progressing Towards Goal  Goal: *Performs self breast care  Outcome: Progressing Towards Goal     Problem: Pain  Goal: *Control of Pain  Outcome: Progressing Towards Goal  Goal: *PALLIATIVE CARE:  Alleviation of Pain  Outcome: Progressing Towards Goal     Problem: Patient Education: Go to Patient Education Activity  Goal: Patient/Family Education  Outcome: Progressing Towards Goal

## 2020-01-18 NOTE — PROGRESS NOTES
1600 Received care of mother in room, no distress, call bell in reach, family Q@ bedside , breastpump given b/c infant     Breastfeeding  and on triple photo to assess amount,  And understood usage  2300 BEDSIDE_VERBAL_RECORDED_WRITTEN: shift change report given to 2185 CRISTEL Glover (oncoming nurse) by Corey Nicholson (offgoing nurse). Report given with SBAR, Kardex and MAR.

## 2020-01-18 NOTE — PROGRESS NOTES
1930- Rounded on pt with off-going nurse (Immanuel Larson RN), laying comfortably in bed talking with company. Pt is requesting Motrin for moderate pain. Pt has listed allergies to Aleve with naproxen and Allevess patch resulting in anaphylaxis. Pt verbally verified that she can take generic naproxen and ibuprofen and cannot take Aleve. Will contact on call midwife or physician to attain order for Motrin. 2035- Rounded on pt, laying comfortably in bed. Pt inquiring about Motrin. Informed pt still in process of speaking with midwife to acquire the medication order. Pt verbalized understanding. No other needs expressed at this time. 2120- Assessment performed without complications. VSS. Pt in no apparent distress or discomfort. 2220- Pt rates pain 4/10. Motrin given without complications. No other needs expressed at this time. 2340- Pt resting comfortably in bed with eyes closed. Chest expansion noted. Bed in lowest position, call bell within reach. 0050- Rounded on pt, laying comfortably in bed. No needs expressed at this time. Bed in lowest position, call bell in reach. 0210- Pt resting comfortably in bed with eyes closed. Chest expansion noted. Bed in lowest position, call bell within reach. 0500- Pt resting comfortably in bed with eyes closed. Chest expansion noted. Bed in lowest position, call bell within reach. 0630- Pt resting comfortably in bed with eyes closed. Chest expansion noted. Bed in lowest position, call bell within reach.

## 2020-01-18 NOTE — PROGRESS NOTES
1915- Bedside and Verbal shift change report given to Ryoal Maldonado RN (oncoming nurse) by Shell Madrigal RN (offgoing nurse). Report included the following information SBAR, Kardex and MAR.

## 2020-01-18 NOTE — ANESTHESIA POSTPROCEDURE EVALUATION
1/18/2020  9:16 AM    Laboring Epidural Follow-up Note     Referring physician: María Elena Cope MD   Patient status post vaginal delivery with labor epidural    Visit Vitals  /74 (BP 1 Location: Right arm, BP Patient Position: At rest)   Pulse 94   Temp 36.9 °C (98.4 °F)   Resp 16   Ht 5' 7\" (1.702 m)   Wt 85.7 kg (189 lb)   SpO2 100%   Breastfeeding Unknown   BMI 29.60 kg/m²       Epidural removed by L&D staff  No sedation, pruritis noted. Adequate analgesia.   No obvious anesthesia complications          Georgina Schmidt CRNA

## 2020-01-18 NOTE — PROGRESS NOTES
0715- Bedside and Verbal shift change report given to JUAN Sprague RN (oncoming nurse) by Dangelo Cm RN (offgoing nurse). Report included the following information SBAR, Kardex and MAR.

## 2020-01-19 VITALS
TEMPERATURE: 98.3 F | HEART RATE: 74 BPM | BODY MASS INDEX: 29.66 KG/M2 | RESPIRATION RATE: 18 BRPM | WEIGHT: 189 LBS | DIASTOLIC BLOOD PRESSURE: 78 MMHG | HEIGHT: 67 IN | SYSTOLIC BLOOD PRESSURE: 138 MMHG | OXYGEN SATURATION: 100 %

## 2020-01-19 PROCEDURE — 74011250637 HC RX REV CODE- 250/637: Performed by: MIDWIFE

## 2020-01-19 PROCEDURE — 74011250636 HC RX REV CODE- 250/636: Performed by: OBSTETRICS & GYNECOLOGY

## 2020-01-19 PROCEDURE — 74011250636 HC RX REV CODE- 250/636: Performed by: ADVANCED PRACTICE MIDWIFE

## 2020-01-19 PROCEDURE — 90715 TDAP VACCINE 7 YRS/> IM: CPT | Performed by: ADVANCED PRACTICE MIDWIFE

## 2020-01-19 RX ADMIN — HUMAN RHO(D) IMMUNE GLOBULIN 0.3 MG: 300 INJECTION, SOLUTION INTRAMUSCULAR at 05:34

## 2020-01-19 RX ADMIN — TETANUS TOXOID, REDUCED DIPHTHERIA TOXOID AND ACELLULAR PERTUSSIS VACCINE, ADSORBED 0.5 ML: 5; 2.5; 8; 8; 2.5 SUSPENSION INTRAMUSCULAR at 17:08

## 2020-01-19 RX ADMIN — IBUPROFEN 800 MG: 400 TABLET, FILM COATED ORAL at 16:08

## 2020-01-19 NOTE — PROGRESS NOTES
Problem: Patient Education: Go to Patient Education Activity  Goal: Patient/Family Education  Outcome: Resolved/Met     Problem: Pain  Goal: *Control of Pain  Outcome: Progressing Towards Goal     Problem: Patient Education: Go to Patient Education Activity  Goal: Patient/Family Education  Outcome: Progressing Towards Goal

## 2020-01-19 NOTE — DISCHARGE INSTRUCTIONS
POST DELIVERY DISCHARGE INSTRUCTIONS    Name: Bishop Bolton  YOB: 1985  Primary Diagnosis: Active Problems:    Normal labor (2020)      IUP (intrauterine pregnancy), incidental (2020)      37 weeks gestation of pregnancy (2020)        General:     Diet/Diet Restrictions:  Eight 8-ounce glasses of fluid daily (water, juices); avoid excessive caffeine intake. Meals/snacks as desired which are high in fiber and carbohydrates and low in fat and cholesterol. Physical Activity / Restrictions / Safety:     Avoid heavy lifting, no more that 8 lbs. For 2-3 weeks; limit use of stairs to 2 times daily for the first week home. No driving for one week. Avoid intercourse 4-6 weeks, no douching or tampon use. Check with obstetrician before starting or resuming an exercise program.         Discharge Instructions/Special Treatment/Home Care Needs:     Continue prenatal vitamins. Continue to use squirt bottle with warm water on your episiotomy after each bathroom use until bleeding stops. If steri-strips applied to your incision, remove in 7-10 days. Call your doctor for the following:     Fever over 101 degrees by mouth. Vaginal bleeding heavier than a normal menstrual period or clot larger than a golf ball. Red streaks or increased swelling of legs, painful red streaks on your breast.  Painful urination, constipation and increased pain or swelling or discharge with your incision. If you feel extremely anxious or overwhelmed. If you have thoughts of harming yourself and/or your baby. Pain Management:     Pain Management:   Take Acetaminophen (Tylenol) or Ibuprofen (Advil, Motrin), as directed for pain. Use a warm Sitz bath 3 times daily to relieve episiotomy or hemorrhoidal discomfort. Heating pad to  incision as needed. For hemorrhoidal discomfort, use Tucks and Anusol cream as needed and directed. Follow-Up Care:      These are general instructions for a healthy lifestyle:    No smoking/ No tobacco products/ Avoid exposure to second hand smoke    Surgeon General's Warning:  Quitting smoking now greatly reduces serious risk to your health. Obesity, smoking, and sedentary lifestyle greatly increases your risk for illness    A healthy diet, regular physical exercise & weight monitoring are important for maintaining a healthy lifestyle    Recognize signs and symptoms of STROKE:    F-face looks uneven    A-arms unable to move or move unevenly    S-speech slurred or non-existent    T-time-call 911 as soon as signs and symptoms begin-DO NOT go       Back to bed or wait to see if you get better-TIME IS BRAIN. Week 37 of Your Pregnancy: Care Instructions  Your Care Instructions  How can you care for yourself at home? Learn about breastfeeding  · Breastfeeding is best for your baby and good for you. · Breast milk has antibodies to help your baby fight infections. · Mothers who breastfeed often lose weight faster, because making milk burns calories. · Learning the best ways to hold your baby will make breastfeeding easier. · Let your partner bathe and diaper the baby to keep your partner from feeling left out. Snuggle together when you breastfeed. · You may want to learn how to use a breast pump and store your milk. · If you choose to bottle feed, make the feeding feel like breastfeeding so you can bond with your baby. Always hold your baby and the bottle. Do not prop bottles or let your baby fall asleep with a bottle. Learn about crying  · It is common for babies to cry for 1 to 3 hours a day. Some cry more, some cry less. · Babies don't cry to make you upset or because you are a bad parent. · Crying is how your baby communicates. Your baby may be hungry; have gas; need a diaper change; or feel cold, warm, tired, lonely, or tense. Sometimes babies cry for unknown reasons. · If you respond to your baby's needs, he or she will learn to trust you.   · Try to stay calm when your baby cries. Your baby may get more upset if he or she senses that you are upset. Know how to care for your   · Your baby's umbilical cord stump will drop off on its own, usually between 1 and 2 weeks. To care for your baby's umbilical cord area:  ? Clean the area at the bottom of the cord 2 or 3 times a day. ? Pay special attention to the area where the cord attaches to the skin. ? Keep the diaper folded below the cord. ? Use a damp washcloth or cotton ball to sponge bathe your baby until the stump has come off. · Your baby's first dark stool is called meconium. After the meconium is passed, your baby will develop his or her own bowel pattern. ? Some babies, especially  babies, have several bowel movements a day. Others have one or two a day, or one every 2 to 3 days. ?  babies often have loose, yellow stools. Formula-fed babies have more formed stools. ? If your baby's stools look like little pellets, he or she is constipated. After 2 days of constipation, call your baby's doctor. · If your baby will be circumcised, you can care for him at home. ? Gently rinse his penis with warm water after every diaper change. Do not try to remove the film that forms on the penis. This film will go away on its own. Pat dry. ? Put petroleum ointment, such as Vaseline, on the area of the diaper that will touch your baby's penis. This will keep the diaper from sticking to your baby. ? Ask the doctor about giving your baby acetaminophen (Tylenol) for pain. Where can you learn more? Go to http://kimberly-lexa.info/. Enter 68 21 [id number] in the search box to learn more about \"Week 37 of Your Pregnancy: Care Instructions. \"  Current as of: May 29, 2019  Content Version: 12.2  © 9328-3428 [employer]. Care instructions adapted under license by [employer] (which disclaims liability or warranty for this information).  If you have questions about a medical condition or this instruction, always ask your healthcare professional. [address] any warranty or liability for your use of this information. Patient Education   Patient armband removed and shredded     Depression After Childbirth: Care Instructions  Your Care Instructions    Many women get the \"baby blues\" during the first few days after childbirth. You may lose sleep, feel irritable, and cry easily. You may feel happy one minute and sad the next. Hormone changes are one cause of these emotional changes. Also, the demands of a new baby, along with visits from relatives or other family needs, add to a mother's stress. The \"baby blues\" often peak around the fourth day. Then they ease up in less than 2 weeks. If your moodiness or anxiety lasts for more than 2 weeks, or if you feel like life is not worth living, you may have postpartum depression. This is different for each mother. Some mothers with serious depression may worry intensely about their infant's well-being. Others may feel distant from their child. Some mothers might even feel that they might harm their baby. A mother may have signs of paranoia, wondering if someone is watching her. Depression is not a sign of weakness. It is a medical condition that requires treatment. Medicine and counseling often work well to reduce depression. Talk to your doctor about taking antidepressant medicine while breastfeeding. Follow-up care is a key part of your treatment and safety. Be sure to make and go to all appointments, and call your doctor if you are having problems. It's also a good idea to know your test results and keep a list of the medicines you take. How do you know if you are depressed? With all the changes in your life, you may not know if you are depressed. Pregnancy sometimes causes changes in how you feel that are similar to the symptoms of depression.   Symptoms of depression include:  · Feeling sad or hopeless and losing interest in daily activities. These are the most common symptoms of depression. · Sleeping too much or not enough. · Feeling tired. You may feel as if you have no energy. · Eating too much or too little. · Writing or talking about death, such as writing suicide notes or talking about guns, knives, or pills. Keep the numbers for these national suicide hotlines: 5-704-392-TALK (3-390.961.1269) and 7-455-LVAALFN (6-350.107.9551). If you or someone you know talks about suicide or feeling hopeless, get help right away. How can you care for yourself at home? · Be safe with medicines. Take your medicines exactly as prescribed. Call your doctor if you think you are having a problem with your medicine. · Eat a healthy diet so that you can keep up your energy. · Get regular daily exercise, such as walks, to help improve your mood. · Get as much sunlight as possible. Keep your shades and curtains open. Get outside as much as you can. · Avoid using alcohol or other substances to feel better. · Get as much rest and sleep as possible. Avoid doing too much. Being too tired can increase depression. · Play stimulating music throughout your day and soothing music at night. · Schedule outings and visits with friends and family. Ask them to call you regularly, so that you do not feel alone. · Ask for help with preparing food and other daily tasks. Family and friends are often happy to help a mother with a . · Be honest with yourself and those who care about you. Tell them about your struggle. · Join a support group of new mothers. No one can better understand the challenges of caring for a  than other new mothers. · If you feel like life is not worth living or are feeling hopeless, get help right away. Keep the numbers for these national suicide hotlines: 1-558-475-TALK (1-469.166.2748) and 6-741-HLVKMOM (1-331.612.7413). When should you call for help?   Call 911 anytime you think you may need emergency care. For example, call if:    · You feel you cannot stop from hurting yourself, your baby, or someone else.   [facility] your doctor now or seek immediate medical care if:    · You are having trouble caring for yourself or your baby.     · You hear voices.   Heri Wilver closely for changes in your health, and be sure to contact your doctor if:    · You have problems with your depression medicine.     · You do not get better as expected. Where can you learn more? Go to http://kimberly-lexa.info/. Enter F062 in the search box to learn more about \"Depression After Childbirth: Care Instructions. \"  Current as of: May 28, 2019  Content Version: 12.2  © 0114-4390 [employer]. Care instructions adapted under license by [employer] (which disclaims liability or warranty for this information). If you have questions about a medical condition or this instruction, always ask your healthcare professional. [address] any warranty or liability for your use of this information.

## 2020-01-19 NOTE — PROGRESS NOTES
Time spent with patient reviewing discharge instructions to include the following information that was also provided in written form to the patient; normal vaginal bleeding and what to expect, how to care for perineum and how to respond should she experience an increase in vaginal bleeding. Patient instructed that she should not lift more than the weight of her baby for at least a week . She was encouraged to stay hydrated and informed that she should not have sex, douche, use tampons,  or place anything in the vagina until her postpartum visit. Additionally she was instructed not to use tub baths, hot tubs or pools until cleared by her midwife or physician. Patient was informed that some swelling of her legs can be expected after delivery and to elevate them whenever possible. If the swelling increases or she has signs of calf pain/tenderness, or redness that is present in one leg more than the other she is to notify her provider or present in the emergency department for evaluation if unable to reach provider. Patient was given signs and symptoms of infection and instructed to call provider with temperature equal to or > than 100.4, foul smelling blood or discharge from the vagina. Patient was also instructed on the signs of postpartum depression and instructed that if she is considering harming herself or her infant, feels out of control, unable to care for herself or baby, feels sad or depressed most of the day every day, is having trouble sleeping or sleeping too much or is having trouble bonding with her baby she is to call her provider or present in the emergency department. Patient was also provided with signs and symptoms of a pulmonary embolism (PE). She was told what a PE is and instructed to call 911 if she experiences SOB (fast, shallow, rapid respirations) at rest, chest pain that worsens when coughing or change in her level of consciousness.     Patient was informed that she might experience blood pressure changes during the postpartum weeks and that she should contact her provider with any severe, constant headaches that do not respond to over-the-counter pain medication, rest and/or hydration. She is also to call her provider with any changes in vision, seeing spots, or flashing lights, pain in the upper right quadrant of the abdomen, swelling of the face, hands, and/or legs more than what is expected or a change in her level of consciousness. Patient instructed to call her OB office to schedule an appt for a 6wks postpartum check and emphasized the importance of telling all providers her delivery date up until one year after the birth of her baby. Date of postpartum visit was confirmed prior to delivery. All questions were answered and patient voiced understanding of the information provided.

## 2020-01-20 LAB
ABO + RH BLD: NORMAL
ABO + RH BLDCO: NORMAL
BLD PROD TYP BPU: NORMAL
BPU ID: NORMAL
FETAL SCREEN,FMHS: NORMAL
STATUS OF UNIT,%ST: NORMAL
UNIT DIVISION, %UDIV: 0